# Patient Record
Sex: MALE | Race: WHITE
[De-identification: names, ages, dates, MRNs, and addresses within clinical notes are randomized per-mention and may not be internally consistent; named-entity substitution may affect disease eponyms.]

---

## 2019-01-14 ENCOUNTER — HOSPITAL ENCOUNTER (EMERGENCY)
Dept: HOSPITAL 46 - ED | Age: 49
Discharge: HOME | End: 2019-01-14
Payer: MEDICAID

## 2019-01-14 VITALS — WEIGHT: 220 LBS | BODY MASS INDEX: 29.16 KG/M2 | HEIGHT: 73 IN

## 2019-01-14 DIAGNOSIS — J40: ICD-10-CM

## 2019-01-14 DIAGNOSIS — F41.9: ICD-10-CM

## 2019-01-14 DIAGNOSIS — F31.9: Primary | ICD-10-CM

## 2019-01-14 DIAGNOSIS — Z79.899: ICD-10-CM

## 2019-01-14 NOTE — XMS
PreManage Notification: IVANIA ROSS MRN:[id number]
 
Security Information
 
Security Events
No recent Security Events currently on file
 
 
 
CRITERIA MET
------------
- [facility] - 2 Visits in 30 Days
 
 
CARE PROVIDERS
-------------------------------------------------------------------------------------
CARMELO GARCIA       Physician Assistant     Current
[place]
 
PHONE: Unknown
-------------------------------------------------------------------------------------
CARMELO GARCIA     Primary Care     Current
 
PHONE: Unknown
-------------------------------------------------------------------------------------
 
Nancy has no Care Guidelines for this patient.
 
DIDIER VISIT COUNT (12 MO.)
-------------------------------------------------------------------------------------
1 [facility] [place]
 
[facility]
-------------------------------------------------------------------------------------
TOTAL 3
-------------------------------------------------------------------------------------
NOTE: Visits indicate total known visits.
 
ED/UCC VISIT TRACKING (12 MO.)
-------------------------------------------------------------------------------------
01/14/2019 12:43
[facility]
 
TYPE: Emergency
 
COMPLAINT:
- MEDICATION REFILL
-------------------------------------------------------------------------------------
01/06/2019 23:36
[facility]
 
TYPE: Emergency
 
DIAGNOSES:
0. HPYERTENSION
-------------------------------------------------------------------------------------
08/25/2018 12:47
 
[facility] [place]     [place]
 
TYPE: Emergency
 
DIAGNOSES:
- shortness of breath
- Dyspnea, unspecified
- Essential (primary) hypertension
-------------------------------------------------------------------------------------
 
 
INPATIENT VISIT TRACKING (12 MO.)
No inpatient visits to display in this time frame
 
[email or web address]

## 2019-06-13 ENCOUNTER — HOSPITAL ENCOUNTER (EMERGENCY)
Dept: HOSPITAL 46 - ED | Age: 49
Discharge: HOME | End: 2019-06-13
Payer: MEDICAID

## 2019-06-13 VITALS — BODY MASS INDEX: 29.16 KG/M2 | WEIGHT: 220 LBS | HEIGHT: 73 IN

## 2019-06-13 DIAGNOSIS — I10: ICD-10-CM

## 2019-06-13 DIAGNOSIS — B86: Primary | ICD-10-CM

## 2019-06-13 DIAGNOSIS — Z79.899: ICD-10-CM

## 2019-06-13 NOTE — XMS
PreManage Notification: IVANIA ROSS MRN:[id number]
 
Security Information
 
Security Events
No recent Security Events currently on file
 
 
 
CRITERIA MET
------------
- 6 ED Visits in 6 Months
- [facility] - 2 Visits in 30 Days
 
 
CARE PROVIDERS
-------------------------------------------------------------------------------------
CARMELO GARCIA     Physician Assistant     Current
 
PHONE: Unknown
-------------------------------------------------------------------------------------
CARMELO GARCIA     Primary Care     Current
 
PHONE: Unknown
-------------------------------------------------------------------------------------
 
Nancy has no Care Guidelines for this patient.
 
DID[facility] VISIT COUNT (12 MO.)
-------------------------------------------------------------------------------------
[facility] [place]
 
1 [facility]
 
[facility] [place]
 
1 [facility] 
 
2 [facility]
 
[facility]
-------------------------------------------------------------------------------------
TOTAL 7
-------------------------------------------------------------------------------------
NOTE: Visits indicate total known visits.
 
ED/UCC VISIT TRACKING (12 MO.)
-------------------------------------------------------------------------------------
06/13/2019 19:23
[facility]
 
TYPE: Emergency
 
COMPLAINT:
- SKIN ISSUE
-------------------------------------------------------------------------------------
06/11/2019 20:32
[facility] [place]
 
 
TYPE: Emergency
 
DIAGNOSES:
- Skin Irritation
-------------------------------------------------------------------------------------
02/09/2019 18:08
[facility]      [place]
[facility]
 
TYPE: Emergency
 
DIAGNOSES:
0. RASH
-------------------------------------------------------------------------------------
01/27/2019 23:24
[facility]
 
TYPE: Emergency
 
DIAGNOSES:
- Skin is Itching
- Other stimulant abuse, uncomplicated
- Itching
- Pruritus, unspecified
-------------------------------------------------------------------------------------
01/14/2019 12:43
[facility]
 
TYPE: Emergency
 
COMPLAINT:
- MEDICATION REFILL
 
DIAGNOSES:
- Encounter for issue of repeat prescription
- Bipolar disorder, unspecified
- Anxiety disorder, unspecified
- Other long term (current) drug therapy
- Bronchitis, not specified as acute or chronic
-------------------------------------------------------------------------------------
01/06/2019 23:36
[facility]
 
TYPE: Emergency
 
DIAGNOSES:
0. HPYERTENSION
-------------------------------------------------------------------------------------
08/25/2018 12:47
[facility] [place]
 
TYPE: Emergency
 
DIAGNOSES:
- shortness of breath
- Dyspnea, unspecified
- Essential (primary) hypertension
-------------------------------------------------------------------------------------
 
 
 
INPATIENT VISIT TRACKING (12 MO.)
No inpatient visits to display in this time frame
 
[email or web address]

## 2019-06-18 ENCOUNTER — HOSPITAL ENCOUNTER (EMERGENCY)
Dept: HOSPITAL 95 - ER | Age: 49
Discharge: HOME | End: 2019-06-18
Payer: COMMERCIAL

## 2019-06-18 VITALS — HEIGHT: 73 IN | WEIGHT: 180.01 LBS | BODY MASS INDEX: 23.86 KG/M2

## 2019-06-18 DIAGNOSIS — B86: Primary | ICD-10-CM
